# Patient Record
Sex: FEMALE | Race: WHITE
[De-identification: names, ages, dates, MRNs, and addresses within clinical notes are randomized per-mention and may not be internally consistent; named-entity substitution may affect disease eponyms.]

---

## 2021-05-22 ENCOUNTER — HOSPITAL ENCOUNTER (INPATIENT)
Dept: HOSPITAL 96 - M.ERS | Age: 71
LOS: 3 days | Discharge: ANOTHER HOSPITAL | DRG: 542 | End: 2021-05-25
Attending: INTERNAL MEDICINE | Admitting: INTERNAL MEDICINE
Payer: MEDICARE

## 2021-05-22 VITALS — SYSTOLIC BLOOD PRESSURE: 114 MMHG | DIASTOLIC BLOOD PRESSURE: 69 MMHG

## 2021-05-22 VITALS — HEIGHT: 62.01 IN | BODY MASS INDEX: 32.09 KG/M2 | WEIGHT: 176.61 LBS

## 2021-05-22 VITALS — DIASTOLIC BLOOD PRESSURE: 76 MMHG | SYSTOLIC BLOOD PRESSURE: 161 MMHG

## 2021-05-22 VITALS — DIASTOLIC BLOOD PRESSURE: 64 MMHG | SYSTOLIC BLOOD PRESSURE: 152 MMHG

## 2021-05-22 VITALS — DIASTOLIC BLOOD PRESSURE: 69 MMHG | SYSTOLIC BLOOD PRESSURE: 105 MMHG

## 2021-05-22 DIAGNOSIS — I48.0: ICD-10-CM

## 2021-05-22 DIAGNOSIS — Z88.2: ICD-10-CM

## 2021-05-22 DIAGNOSIS — E11.649: ICD-10-CM

## 2021-05-22 DIAGNOSIS — J96.20: ICD-10-CM

## 2021-05-22 DIAGNOSIS — I25.10: ICD-10-CM

## 2021-05-22 DIAGNOSIS — I50.9: ICD-10-CM

## 2021-05-22 DIAGNOSIS — I25.5: ICD-10-CM

## 2021-05-22 DIAGNOSIS — E03.9: ICD-10-CM

## 2021-05-22 DIAGNOSIS — I11.0: ICD-10-CM

## 2021-05-22 DIAGNOSIS — E11.9: ICD-10-CM

## 2021-05-22 DIAGNOSIS — M80.052A: Primary | ICD-10-CM

## 2021-05-22 DIAGNOSIS — Z20.822: ICD-10-CM

## 2021-05-22 DIAGNOSIS — E78.5: ICD-10-CM

## 2021-05-22 DIAGNOSIS — Z90.49: ICD-10-CM

## 2021-05-22 DIAGNOSIS — Z79.84: ICD-10-CM

## 2021-05-22 DIAGNOSIS — I65.23: ICD-10-CM

## 2021-05-22 DIAGNOSIS — Z96.651: ICD-10-CM

## 2021-05-22 DIAGNOSIS — Z88.8: ICD-10-CM

## 2021-05-22 DIAGNOSIS — Z88.0: ICD-10-CM

## 2021-05-22 DIAGNOSIS — J44.9: ICD-10-CM

## 2021-05-22 DIAGNOSIS — I48.91: ICD-10-CM

## 2021-05-22 DIAGNOSIS — Z79.899: ICD-10-CM

## 2021-05-22 DIAGNOSIS — I25.2: ICD-10-CM

## 2021-05-22 DIAGNOSIS — S72.092A: ICD-10-CM

## 2021-05-22 LAB
ABSOLUTE MONOCYTES: 0.3 THOU/UL (ref 0–1.2)
ALBUMIN SERPL-MCNC: 2.6 G/DL (ref 3.4–5)
ALP SERPL-CCNC: 180 U/L (ref 46–116)
ALT SERPL-CCNC: 18 U/L (ref 30–65)
ANION GAP SERPL CALC-SCNC: < 0 MMOL/L (ref 7–16)
ANISOCYTOSIS BLD QL SMEAR: (no result)
APTT BLD: 29.1 SECONDS (ref 25–31.3)
AST SERPL-CCNC: 19 U/L (ref 15–37)
BILIRUB SERPL-MCNC: 0.3 MG/DL
BUN SERPL-MCNC: 18 MG/DL (ref 7–18)
CALCIUM SERPL-MCNC: 8.2 MG/DL (ref 8.5–10.1)
CHLORIDE SERPL-SCNC: 100 MMOL/L (ref 98–107)
CO2 SERPL-SCNC: 39 MMOL/L (ref 21–32)
CREAT SERPL-MCNC: 1.1 MG/DL (ref 0.6–1.3)
GLUCOSE SERPL-MCNC: 173 MG/DL (ref 70–99)
GRANULOCYTES NFR BLD MANUAL: 77 %
HCT VFR BLD CALC: 32.5 % (ref 37–47)
HGB BLD-MCNC: 10.5 GM/DL (ref 12–15)
INR PPP: 1
LYMPHOCYTES # BLD: 0.5 THOU/UL (ref 0.8–5.3)
LYMPHOCYTES NFR BLD AUTO: 6 %
MCH RBC QN AUTO: 30.2 PG (ref 26–34)
MCHC RBC AUTO-ENTMCNC: 32.3 G/DL (ref 28–37)
MCV RBC: 93.6 FL (ref 80–100)
MONOCYTES NFR BLD: 4 %
MPV: 8.6 FL. (ref 7.2–11.1)
NEUTROPHILS # BLD: 7.7 THOU/UL (ref 1.6–8.1)
NEUTS BAND NFR BLD: 13 %
NUCLEATED RBCS: 0 /100WBC
PLATELET # BLD EST: ADEQUATE 10*3/UL
PLATELET COUNT*: 271 THOU/UL (ref 150–400)
POTASSIUM SERPL-SCNC: 4.3 MMOL/L (ref 3.5–5.1)
PROT SERPL-MCNC: 7.2 G/DL (ref 6.4–8.2)
PROTHROMBIN TIME: 10.9 SECONDS (ref 9.2–11.5)
RBC # BLD AUTO: 3.47 MIL/UL (ref 4.2–5)
RDW-CV: 17.4 % (ref 10.5–14.5)
SODIUM SERPL-SCNC: 138 MMOL/L (ref 136–145)
WBC # BLD AUTO: 8.6 THOU/UL (ref 4–11)

## 2021-05-22 NOTE — CON
[facility] 
[address] [place]  [address]                    CONSULTATION                  
_______________________________________________________________________________
 
Name:       VAIBHAV JIMENEZ                Room:           [address] [place]  
M.R.#:  [id number]      Account #:      [id number]  
Admission:  05/22/21     Attend Phys:    DIMPLE Roberson
Discharge:               Date of Birth:  [birth year]  
         Report #: [id number]
                                                                     [id number]
_______________________________________________________________________________
THIS REPORT FOR:  
 
cc:  Parminder Chaparro Steve T. DO Khosla, Parveen K. MD                                              ~
 
 
DOC #: [id number]
Lance Goss MD
DATE OF CONSULTATION: 05/24/2021
 
HISTORY OF PRESENT ILLNESS:  A 71-year-old female patient who was evaluated by 
me for carotid stenosis.
 
This patient was discussed with Dr. Angeles, the hospitalist and Dr. Denson, the 
hospitalist at [facility]. The patient herself provided some 
history.  This patient is somewhat confused.  She gives a history that she fell 
down.  It is somewhat unusual event, but it looks like she did not pass out and 
she did not fall because of any hemiplegia.  She started having pain in the left
leg and she has a fractured left hip as I understand. A carotid Doppler was 
done, but the patient says that she does not have any symptoms suggestive of TIA
or a stroke.  She does not believe she ever had a carotid Doppler before.
 
REVIEW OF SYSTEMS:  Positive for COPD.  It is a longstanding process and looks 
like she is on supplemental oxygen.  She does have a heart murmur and looks like
she is being worked up for aortic stenosis.  The record indicates that she does 
have a history of atrial fibrillation.  She does not tell me anything about 
that. For some reason, she is on carbamazepine and she was confused and she did 
not give me a history why she is on carbamazepine.  She has a prior history of 
heart attack, total knee replacement, rotator cuff tear, COPD and she still 
smokes.  She has a history of diabetes also.  She has a history of 
hypercholesterolemia and this was her relevant 14-point review of systems, which
I can get from this patient or from the patient's chart.
 
PAST MEDICAL HISTORY:  Negative for any TIA or a stroke, but the history is not 
reliable.
 
SOCIAL HISTORY:  The patient still smokes.
 
FAMILY HISTORY:  Negative for any early age stroke.
 
PHYSICAL EXAMINATION:  Her examinations indicate she is alert, but she takes a 
long time to tell me what month it is and then she changes it to incorrect 
month.  She could not tell me what hospital she is in.  Her speech looks intact,
although she repeats things, but I am not sure whether it is because of memory 
problems.  She does appear to be short of breath.  I do not know how much it is 
 
 
 
[place] [address]                    CONSULTATION                  
_______________________________________________________________________________
 
Name:       BARBARAVAIBHAV                Room:           [address] [place]  
.R.#:  [id number]      Account #:      [id number]  
Admission:  05/22/21     Attend Phys:    DIMPLE Roberson
Discharge:               Date of Birth:  [birth year]  
         Report #: [id number]
                                                                     [id number]
_______________________________________________________________________________
 
 
her baseline.
 
Her cranial nerve examination was difficult to carry out. I can tell about 
hemianopsia.
 
Neuromuscular examination, she may be trace weak on the right side, but it is 
tough to tell.  I tried to do the position sense.  I do not think she cooperated
much. Reflexes are symmetrical.
 
Heart does appear to be irregular.  She has rhonchi and she is short of breath.
 
Blood pressure is 159/86, respirations 16, pulse is 72, temperature is 97.4.
 
LABORATORY DATA:  Indicate a white count of 6.3.
 
Her CT was reviewed and a CT scan is impressive.  She has a pretty significant 
stenosis of the left carotid and she also has a significant stenosis of the 
right carotid.
 
IMPRESSION:  This is an extremely complicated patient on which there is no good 
option.  This patient is going to be on a high risk for complications from the 
cardiac perspective, from the respiratory perspective. The carotid stenosis 
further complicates the situation.  The data on incidence of stroke after 
asymptomatic carotid stenosis, after a noncardiac surgery is controversial. In 
fact, most of the data that shows that asymptomatic carotid stenosis does not 
show any significant increased risk of stroke compared to the baseline risk.  In
this patient, the situation is even further difficult.  If we need to address 
the patient's carotid stenosis, that will require a left carotid endarterectomy,
then right carotid endarterectomy and then the patient's hip surgery.  With her 
severe COPD, it is very likely she will run into complication in one or the 
other surgery.  I had a long discussion with this patient.  I told it does not 
matter what is done on her if she is on a high risk of having a stroke.  She 
needs to stop smoking, but that is not going to help her in the short run.  I 
suggested that we get an MRI of the brain done.  If MRI of the brain 
demonstrates that she has a stroke and it is in the appropriate location of 
either right carotid or left carotid, the stenosis should be considered 
symptomatic.  Then, her chances of having a stroke becomes pretty high and the 
carotid definitely needs to be addressed.  She does not have any family and I do
not think she is a fully cognitively intact at the moment.  The chances of 
running into complication and stroke is very high, it does not matter what we do
and she understands that.  In that regard, I think it may be desirable to 
proceed with the surgery if the stenosis is not symptomatic, but if we go 
through that route, her blood pressure needs to be kept high during the surgery 
and it should not drop to prevent collaterals from collapsing.  Again, that is 
 
 
 
[address] [place]  [address]                    CONSULTATION                  
_______________________________________________________________________________
 
Name:       VAIBHAV JIMENEZ                Room:           [address] [place]  
M.R.#:  [id number]      Account #:      [id number]  
Admission:  05/22/21     Attend Phys:    DIMPLE Roberson
Discharge:               Date of Birth:  [birth year]  
         Report #: [id number]
                                                                     [id number]
_______________________________________________________________________________
 
 
not an ideal option or even close to it, but that may be what we have to follow.
 She is agreeable with that and I will see if any family is here, but she says 
the only person she knows is her sister and she does not talk to her and I will 
discuss with other consultants tomorrow.  Cardiothoracic surgery has been 
consulted and I will also give them a call tomorrow after the MRI is done.
 
I spent more than 50 minutes of time taking care of this patient today and 
majority was spent reviewing the extensive imaging study data and the patient's 
record and counseling the patient about her options and the difficulty with 
situation.  On the basis of MRI, the question also needs to be addressed whether
she needs to be on anticoagulation because this patient by record does have 
atrial fibrillation and heart does appear to be irregular.
 
Thank you very much.
 
MD CORNEL Zuleta/PHOEBE
 
D: 05/24/2021 09:20 PM
T: 05/25/2021 01:58 AM
 
 
 
 
 
 
 
 
 
 
 
 
 
 
 
 
 
 
 
 
 
 
                       
                                        By:                                
                 
D: 05/24/21 2020_______________________________________
T: 05/25/21 0058Lance Goss MD            /nt

## 2021-05-23 VITALS — SYSTOLIC BLOOD PRESSURE: 134 MMHG | DIASTOLIC BLOOD PRESSURE: 86 MMHG

## 2021-05-23 VITALS — SYSTOLIC BLOOD PRESSURE: 140 MMHG | DIASTOLIC BLOOD PRESSURE: 72 MMHG

## 2021-05-23 VITALS — SYSTOLIC BLOOD PRESSURE: 154 MMHG | DIASTOLIC BLOOD PRESSURE: 73 MMHG

## 2021-05-23 LAB
ANION GAP SERPL CALC-SCNC: < 0 MMOL/L (ref 7–16)
BUN SERPL-MCNC: 20 MG/DL (ref 7–18)
CALCIUM SERPL-MCNC: 8.2 MG/DL (ref 8.5–10.1)
CHLORIDE SERPL-SCNC: 101 MMOL/L (ref 98–107)
CO2 SERPL-SCNC: 40 MMOL/L (ref 21–32)
CREAT SERPL-MCNC: 1.2 MG/DL (ref 0.6–1.3)
GLUCOSE SERPL-MCNC: 35 MG/DL (ref 70–99)
HCT VFR BLD CALC: 31.8 % (ref 37–47)
HGB BLD-MCNC: 10 GM/DL (ref 12–15)
MCH RBC QN AUTO: 29.7 PG (ref 26–34)
MCHC RBC AUTO-ENTMCNC: 31.6 G/DL (ref 28–37)
MCV RBC: 94 FL (ref 80–100)
MPV: 8.9 FL. (ref 7.2–11.1)
PLATELET COUNT*: 302 THOU/UL (ref 150–400)
POTASSIUM SERPL-SCNC: 4.6 MMOL/L (ref 3.5–5.1)
RBC # BLD AUTO: 3.38 MIL/UL (ref 4.2–5)
RDW-CV: 17.1 % (ref 10.5–14.5)
SODIUM SERPL-SCNC: 137 MMOL/L (ref 136–145)
WBC # BLD AUTO: 6.9 THOU/UL (ref 4–11)

## 2021-05-23 PROCEDURE — 5A0935A ASSISTANCE WITH RESPIRATORY VENTILATION, LESS THAN 24 CONSECUTIVE HOURS, HIGH NASAL FLOW/VELOCITY: ICD-10-PCS | Performed by: INTERNAL MEDICINE

## 2021-05-24 VITALS — SYSTOLIC BLOOD PRESSURE: 172 MMHG | DIASTOLIC BLOOD PRESSURE: 69 MMHG

## 2021-05-24 VITALS — SYSTOLIC BLOOD PRESSURE: 135 MMHG | DIASTOLIC BLOOD PRESSURE: 71 MMHG

## 2021-05-24 VITALS — SYSTOLIC BLOOD PRESSURE: 159 MMHG | DIASTOLIC BLOOD PRESSURE: 86 MMHG

## 2021-05-24 VITALS — DIASTOLIC BLOOD PRESSURE: 74 MMHG | SYSTOLIC BLOOD PRESSURE: 171 MMHG

## 2021-05-24 LAB
ANION GAP SERPL CALC-SCNC: < 0 MMOL/L (ref 7–16)
BUN SERPL-MCNC: 19 MG/DL (ref 7–18)
CALCIUM SERPL-MCNC: 8.4 MG/DL (ref 8.5–10.1)
CHLORIDE SERPL-SCNC: 98 MMOL/L (ref 98–107)
CO2 SERPL-SCNC: 40 MMOL/L (ref 21–32)
CREAT SERPL-MCNC: 1 MG/DL (ref 0.6–1.3)
GLUCOSE SERPL-MCNC: 135 MG/DL (ref 70–99)
HCT VFR BLD CALC: 33.2 % (ref 37–47)
HGB BLD-MCNC: 10.6 GM/DL (ref 12–15)
MCH RBC QN AUTO: 29.7 PG (ref 26–34)
MCHC RBC AUTO-ENTMCNC: 32 G/DL (ref 28–37)
MCV RBC: 92.9 FL (ref 80–100)
MPV: 8.4 FL. (ref 7.2–11.1)
PLATELET COUNT*: 244 THOU/UL (ref 150–400)
POTASSIUM SERPL-SCNC: 4.5 MMOL/L (ref 3.5–5.1)
RBC # BLD AUTO: 3.57 MIL/UL (ref 4.2–5)
RDW-CV: 17.2 % (ref 10.5–14.5)
SODIUM SERPL-SCNC: 137 MMOL/L (ref 136–145)
WBC # BLD AUTO: 6.3 THOU/UL (ref 4–11)

## 2021-05-24 NOTE — EKG
[place] [address]
Phone:  [phone]                     ELECTROCARDIOGRAM REPORT      
_______________________________________________________________________________
 
Name:         VAIBHAV JIMENEZ               Room:          [address]    [place] 
M.R.#:    [id number]     Account #:     [id number]  
Admission:    21    Attend Phys:   Matteo Oates
Discharge:                Date of Birth: [birth year]  
Date of Service: 21 1155  Report #:      [id number]
        [id number]
_______________________________________________________________________________
THIS REPORT FOR:  //name//                      
 
                         [facility] ED
                                       
Test Date:    2021               Test Time:    11:55:02
Pat Name:     VAIBHAV JIMENEZ            Department:   
Patient ID:   [id number]            Room:         [facility]
Gender:       F                        Technician:   ROGERIO
:          1950               Requested By: Rebeca Unger
Order Number: [id number] MD:   Aris Alba
                                 Measurements
Intervals                              Axis          
Rate:         60                       P:            -83
NE:           124                      QRS:          -32
QRSD:         176                      T:            20
QT:           427                                    
QTc:          427                                    
                           Interpretive Statements
Sinus or ectopic atrial rhythm
First-degree AV block
Right bundle branch block
Baseline wander in lead(s) V3
Possible inferior scar
Compared to ECG 2017 21:47:21
Left ventricular hypertrophy no longer present
Electronically Signed On 2021 13:48:31 CDT by Aris Alba
[email or web address]
 
 
 
 
 
 
 
 
 
 
 
 
 
 
 
 
 
  <ELECTRONICALLY SIGNED>
                                           By: Aris Alba MD, [facility]     
  21     1348
D: 21 1155   _____________________________________
T: 21 1155   Aris Alba MD, [facility]       /EPI

## 2021-05-24 NOTE — 2DMMODE
[place] [address]
Phone:  [phone] 2 D/M-MODE ECHOCARDIOGRAM     
_______________________________________________________________________________
 
Name:         VAIBHAV JIMENEZ               Room:          [address] [place] 
[facility]#:    [id number]     Account #:     [id number]  
Admission:    21    Attend Phys:   Matteo Oates
Discharge:                Date of Birth: [birth year]  
Date of Service: 21 1331  Report #:      [id number]
        [id number]
_______________________________________________________________________________
THIS REPORT FOR:
 
cc:  Parminder Chaparro,Aris Lopez MD [facility]       
                                                                       ~
 
--------------- APPROVED REPORT --------------
 
 
Study performed:  2021 09:20:48
 
EXAM: Comprehensive 2D, Doppler, and color-flow 
Echocardiogram 
Patient Location: In-Patient   
Room #:  [facility]     Status:  routine
 
      BSA:         1.81
HR: 80 bpm BP:          135/71 mmHg 
Rhythm: NSR     
 
Other Information 
Study Quality: Good
 
Indications
Pre-Op
 
2D Dimensions
IVSd:  11.53 (7-11mm) LVOT Diam:  20.00 (18-24mm) 
LVDd:  57.68 mm  
PWd:  10.48 (7-11mm) 
LVDs:  52.70 (25-40mm) 
Aortic Root:  29.75 mm 
 
Volumes
Left Atrial Volume (Systole) 
    LA ESV Index:  63.80 mL/m2
 
Aortic Valve
AoV Peak New.:  2.65 m/s 
AO Peak Gr.:  28.01 mmHg LVOT Max P.69 mmHg
AO Mean Gr.:  15.75 mmHg LVOT Mean PG:  3.14 mmHg
    LVOT Max V:  1.29 m/s
AO V2 VTI:  44.73 cm  LVOT Mean V:  0.80 m/s
EDIE (VTI):  1.53 cm2  LVOT V1 VTI:  21.81 cm
AI Hale:  2.52 m/s2  
 
 
[place] [address]
Phone:  [phone]                     2 D/M-MODE ECHOCARDIOGRAM     
_______________________________________________________________________________
 
Name:         VAIBHAV JIMENEZ               Room:          [address] [place] 
..#:    [id number]     Account #:     [id number]  
Admission:    21    Attend Phys:   Matteo Oates
Discharge:                Date of Birth: [birth year]  
Date of Service: 21 1331  Report #:      [id number]
        [id number]
_______________________________________________________________________________
AI PHT:  520.27 ms  
 
Mitral Valve
    E/A Ratio:  0.92
    MV Decel. Time:  151.25 ms
MV E Max New.:  1.13 m/s 
MV PHT:  43.86 ms  
MVA (PHT):  5.02 cm2  
 
TDI
E/Lateral E':  16.14 E/Medial E':  16.14
   Medial E' New.:  0.07 m/s
   Lateral E' New.:  0.07 m/s
 
Pulmonary Valve
PV Peak New.:  1.55 m/s PV Peak Gr.:  9.64 mmHg
 
Tricuspid Valve
    RAP Estimate:  5.00 mmHg
TR Peak Gr.:  46.68 mmHg RVSP:  51.00 mmHg
    PA Pressure:  51.00 mmHg
 
Left Ventricle
The left ventricle is normal size. Regional wall motion abnormalities 
are noted with inferobasilar hypo-akinesis. There is normal left 
ventricular wall thickness. Left ventricular systolic function is 
moderately decreased. LVEF is 35-40%. Grade I - abnormal relaxation 
pattern.
 
Right Ventricle
Right ventricle is mildly dilated. The right ventricular systolic 
function is normal.
 
Atria
Left atrium is moderately dilated. Right atrium is mildly 
dilated.
 
Aortic Valve
Moderate aortic valve sclerosis. Mild aortic regurgitation. Mild to 
moderate aortic stenosis.
 
Mitral Valve
There is mitral annular calcification. Mild mitral regurgitation. No 
evidence of mitral valve stenosis.
 
Tricuspid Valve
 
 
[place] [address]
Phone:  [phone]                     2 D/M-MODE ECHOCARDIOGRAM     
_______________________________________________________________________________
 
Name:         BARBARAVAIBHAV               Room:          [address]#:    [id number]     Account #:     [id number]  
Admission:    21    Attend Phys:   Matteo Oates
Discharge:                Date of Birth: [birth year]  
Date of Service: 21 1331  Report #:      [id number]
        [id number]
_______________________________________________________________________________
The tricuspid valve is normal in structure. Mild tricuspid 
regurgitation. Moderate pulmonary hypertension.
 
Pulmonic Valve
The pulmonary valve is normal in structure. Trace pulmonic 
regurgitation.
 
Great Vessels
The aortic root is normal in size. IVC is normal in size and 
collapses >50% with inspiration.
 
Pericardium
Trace pericardial effusion.
 
<Conclusion>
The left ventricle is normal size.
Left ventricular systolic function is moderately decreased.
LVEF is 35-40%.
Grade I - abnormal relaxation pattern.
Right ventricle is mildly dilated.
Left atrium is moderately dilated.
Right atrium is mildly dilated.
Moderate aortic valve sclerosis.
Mild aortic regurgitation.
Mild to moderate aortic stenosis.
There is mitral annular calcification.
Mild mitral regurgitation.
No evidence of mitral valve stenosis.
The tricuspid valve is normal in structure.
Mild tricuspid regurgitation.
Moderate pulmonary hypertension.
IVC is normal in size and collapses >50% with inspiration.
Trace pericardial effusion.
Regional wall motion abnormalities are noted with inferobasilar 
hypo-akinesis.
 
 
 
 
 
 
 
 
 
  <ELECTRONICALLY SIGNED>
                                           By: Aris Alba MD, FACC     
  21     133
D: 21   _____________________________________
T: 21   Aris Alba MD, FACC       /INF

## 2021-05-25 VITALS — SYSTOLIC BLOOD PRESSURE: 197 MMHG | DIASTOLIC BLOOD PRESSURE: 103 MMHG

## 2021-05-25 VITALS — DIASTOLIC BLOOD PRESSURE: 56 MMHG | SYSTOLIC BLOOD PRESSURE: 185 MMHG

## 2021-05-25 VITALS — DIASTOLIC BLOOD PRESSURE: 94 MMHG | SYSTOLIC BLOOD PRESSURE: 145 MMHG

## 2021-05-25 LAB
BE: 15.6 MMOL/L
PCO2 BLD: 68.9 MMHG (ref 35–45)
PO2 BLD: 74.7 MMHG (ref 75–100)